# Patient Record
Sex: FEMALE | Race: WHITE | NOT HISPANIC OR LATINO | Employment: OTHER | URBAN - METROPOLITAN AREA
[De-identification: names, ages, dates, MRNs, and addresses within clinical notes are randomized per-mention and may not be internally consistent; named-entity substitution may affect disease eponyms.]

---

## 2017-04-13 ENCOUNTER — HOSPITAL ENCOUNTER (OUTPATIENT)
Dept: MAMMOGRAPHY | Facility: CLINIC | Age: 72
Discharge: HOME | End: 2017-04-13
Attending: FAMILY MEDICINE | Admitting: FAMILY MEDICINE
Payer: COMMERCIAL

## 2017-04-13 DIAGNOSIS — Z12.31 VISIT FOR SCREENING MAMMOGRAM: ICD-10-CM

## 2017-04-13 PROCEDURE — G0202 SCR MAMMO BI INCL CAD: HCPCS

## 2017-04-13 PROCEDURE — 77063 BREAST TOMOSYNTHESIS BI: CPT

## 2017-08-29 ENCOUNTER — TRANSFERRED RECORDS (OUTPATIENT)
Dept: HEALTH INFORMATION MANAGEMENT | Facility: CLINIC | Age: 72
End: 2017-08-29

## 2017-08-30 ENCOUNTER — HOSPITAL ENCOUNTER (OUTPATIENT)
Dept: CARDIOLOGY | Facility: CLINIC | Age: 72
Discharge: HOME | End: 2017-08-30
Attending: INTERNAL MEDICINE | Admitting: INTERNAL MEDICINE
Payer: COMMERCIAL

## 2017-08-30 ENCOUNTER — OFFICE VISIT (OUTPATIENT)
Dept: CARDIOLOGY | Facility: CLINIC | Age: 72
End: 2017-08-30
Payer: COMMERCIAL

## 2017-08-30 ENCOUNTER — CARE COORDINATION (OUTPATIENT)
Dept: CARDIOLOGY | Facility: CLINIC | Age: 72
End: 2017-08-30

## 2017-08-30 VITALS
SYSTOLIC BLOOD PRESSURE: 120 MMHG | BODY MASS INDEX: 28.02 KG/M2 | WEIGHT: 148.4 LBS | HEART RATE: 62 BPM | DIASTOLIC BLOOD PRESSURE: 90 MMHG | HEIGHT: 61 IN

## 2017-08-30 DIAGNOSIS — R07.89 CHEST TIGHTNESS OR PRESSURE: ICD-10-CM

## 2017-08-30 DIAGNOSIS — Z91.89 CARDIOVASCULAR RISK FACTOR: ICD-10-CM

## 2017-08-30 DIAGNOSIS — R94.31 NONSPECIFIC ABNORMAL ELECTROCARDIOGRAM (ECG) (EKG): ICD-10-CM

## 2017-08-30 DIAGNOSIS — Z72.0 TOBACCO ABUSE: ICD-10-CM

## 2017-08-30 DIAGNOSIS — E78.5 HYPERLIPIDEMIA WITH TARGET LDL LESS THAN 70: ICD-10-CM

## 2017-08-30 DIAGNOSIS — R94.31 NONSPECIFIC ABNORMAL ELECTROCARDIOGRAM (ECG) (EKG): Primary | ICD-10-CM

## 2017-08-30 PROCEDURE — 93350 STRESS TTE ONLY: CPT | Mod: 26 | Performed by: INTERNAL MEDICINE

## 2017-08-30 PROCEDURE — 93321 DOPPLER ECHO F-UP/LMTD STD: CPT | Mod: 26 | Performed by: INTERNAL MEDICINE

## 2017-08-30 PROCEDURE — 93016 CV STRESS TEST SUPVJ ONLY: CPT | Performed by: INTERNAL MEDICINE

## 2017-08-30 PROCEDURE — 40000264 ECHO STRESS WITH OPTISON

## 2017-08-30 PROCEDURE — 93018 CV STRESS TEST I&R ONLY: CPT | Performed by: INTERNAL MEDICINE

## 2017-08-30 PROCEDURE — 93000 ELECTROCARDIOGRAM COMPLETE: CPT | Performed by: INTERNAL MEDICINE

## 2017-08-30 PROCEDURE — 99204 OFFICE O/P NEW MOD 45 MIN: CPT | Mod: 25 | Performed by: INTERNAL MEDICINE

## 2017-08-30 PROCEDURE — 93325 DOPPLER ECHO COLOR FLOW MAPG: CPT | Mod: 26 | Performed by: INTERNAL MEDICINE

## 2017-08-30 PROCEDURE — 25500064 ZZH RX 255 OP 636: Performed by: INTERNAL MEDICINE

## 2017-08-30 RX ADMIN — HUMAN ALBUMIN MICROSPHERES AND PERFLUTREN 6 ML: 10; .22 INJECTION, SOLUTION INTRAVENOUS at 11:24

## 2017-08-30 NOTE — LETTER
8/30/2017    Rupali Hays MD  [place] [facility] [facility]  [address]  [place] [address]    RE: Evangelina Laurent       Dear Colleague,    I had the pleasure of seeing Evangelina Laurent in the [facility].    INDICATION FOR CARDIAC CONSULTATION:  Abnormal EKG.        It was my pleasure to see your patient, Evangelina Laurent, who is a very pleasant 71-year-old female patient who has multiple risk factors for coronary artery disease.  She is a smoker and has been smoking continuously for 6 years.  Her sister had a heart attack.  Her father had congestive heart failure.  She has a history of hyperlipidemia for which she has been treated with atorvastatin.  She also has a history of prediabetes.        With that background in mind, on Friday, which is approximately 5 days ago, she had an episode of pressure in her upper chest area and chest discomfort.  She felt that there was possibly indigestion.  For approximately 5 hours, she vomited and felt much better.  There was no radiation to the arms or to the jaw.  She has not been complaining of exertional chest pain or chest pressure in the days or months leading up to this event.  She has had no recurrence of the event.  She was not complaining of shortness of breath during the chest discomfort.  She also had no symptoms of syncope or near-syncope.  She had an EKG performed and on the electronic readout was that there was an inferior myocardial infarct.  Indeed, the complexes were low voltage and always could not be seen well in III and aVF were clearly normal in lead II.        We repeated the EKG today and that was normal on our reading, so there was no evidence of an inferior myocardial infarct today.  Clearly R waves were present in aVF and in lead II.  The remainder of the EKG complexes were normal.        The patient's blood pressure was normal today with the diastolic pressure being at the upper normal limits at 90; systolic  pressure was 120.     Outpatient Encounter Prescriptions as of 8/30/2017   Medication Sig Dispense Refill     atorvastatin (LIPITOR) 40 MG tablet Take 40 mg by mouth daily  3     FLUoxetine (PROZAC) 40 MG capsule TAKE 1 CAPSULE BY MOUTH DAILY  3     levothyroxine (SYNTHROID, LEVOTHROID) 88 MCG tablet Take 88 mcg by mouth daily  4     multivitamin, therapeutic with minerals (MULTI-VITAMIN) TABS Take 1 tablet by mouth daily       SENNA PO Take 2 tablets by mouth daily        Cholecalciferol (VITAMIN D3 PO) Take by mouth daily       DiphenhydrAMINE HCl (BENADRYL PO) Take by mouth nightly as needed        No facility-administered encounter medications on file as of 8/30/2017.       IMPRESSION:   1.  Chest discomfort.  Given that the patient has multiple risk factors for coronary artery disease, it will be important to rule out ischemia.  I do not think that the patient had an inferior myocardial infarct and is most likely that the difference between the two EKGs is lead positioning.  Certainly an inferior myocardial infarct is not going to go away in 5 days.  It is definitely useful to repeat the EKG today to show that an inferior myocardial infarct did not occur, at least based upon the EKG criteria.   2.  Multiple risk factors for coronary artery disease.   3.  Tobacco abuse.   4.  Prediabetes.   5.  Treated hyperlipidemia.      PLAN:  We will obtain a stress echocardiogram.  We will have this done quickly as the patient is moving to [place] in early September.  In fact, her health insurance in [place] ends on Friday.  We will try and get a stress echocardiogram performed either this afternoon or tomorrow.  This will be sufficient to ensure that the patient did not have an inferior MI and also the patient does not have ischemia.  Finally, I did tell the patient that she needs to stop smoking immediately.      It has been a pleasure to be involved in the care of this extremely nice patient.  We will contact her with  the results of the stress test.  I did explain to her how important it was to have the stress test before she traveled to [place].  She is driving down and certainly one would not like to have somebody on the road with the possibility of unstable angina pectoris.      It been my pleasure to be involved in the care of this very nice patient.     Sincerely,    Zhang Marshall MD    [facility]

## 2017-08-30 NOTE — PROGRESS NOTES
Negative stress echo. Would let patient know. OK to travel to [place]. No evidence of inferior MI. Should stop smoking indefinitely. Thx

## 2017-08-30 NOTE — MR AVS SNAPSHOT
After Visit Summary   8/30/2017    Evangelina Laurent    MRN: [id number]           Patient Information     Date Of Birth          1945        Visit Information        Provider Department      8/30/2017 8:45 AM Zhang Marshall MD [facility] AT [place]        Today's Diagnoses     Nonspecific abnormal electrocardiogram (ECG) (EKG)    -  1    Chest tightness or pressure        Cardiovascular risk factor        Tobacco abuse        Hyperlipidemia with target LDL less than 70           Follow-ups after your visit        Your next 10 appointments already scheduled     Aug 30, 2017 10:30 AM CDT   Ech Stress Test with [id number]   [facility] CV Echocardiography (Cardiovascular Imaging at [facility])    [address] [address] [phone]           1. Please bring or wear a comfortable two-piece outfit and walking shoes. 2. Stop eating 3 hours before the test. You may drink water or juice. 3. Stop all caffeine 12 hours before the test. This includes coffee, tea, soda pop, chocolate and certain medicines (such as Anacin and Excederin). Also avoid decaf coffee and tea, as these contain small amounts of caffeine. 4. No alcohol, smoking or use of other tobacco products for 12 hours before the test. 5. Refer to your provider instructions to see if you need to stop any medications (such as beta-blockers or nitrates) for this test. 6. For patients with diabetes: - If you take insulin, call your diabetes care team. Ask if you should take a   dose the morning of your test. - If you take diabetes medicine by mouth, dont take it on the morning of your test. Bring it with you to take after the test. (If you have questions, call your diabetes care team) 7. When you arrive, please tell us if: - You have diabetes. - You have taken Viagra, Cialis or Levitra in the past 48 hours. 8. For any questions that cannot be answered, please  "contact the ordering physician              Future tests that were ordered for you today     Open Future Orders        Priority Expected Expires Ordered    Exercise Stress Echocardiogram Routine 2017            Who to contact     If you have questions or need follow up information about today's clinic visit or your schedule please contact [facility]YS[facility] HE[facility] AT [place] directly at [phone].  Normal or non-critical lab and imaging results will be communicated to you by MyChart, letter or phone within 4 business days after the clinic has received the results. If you do not hear from us within 7 days, please contact the clinic through [employer] or phone. If you have a critical or abnormal lab result, we will notify you by phone as soon as possible.  Submit refill requests through [facility] or call your pharmacy and they will forward the refill request to us. Please allow 3 business days for your refill to be completed.          Additional Information About Your Visit        [employer] Information     [facility] lets you send messages to your doctor, view your test results, renew your prescriptions, schedule appointments and more. To sign up, go to [email or web address] . Click on \"Log in\" on the left side of the screen, which will take you to the Welcome page. Then click on \"Sign up Now\" on the right side of the page.     You will be asked to enter the access code listed below, as well as some personal information. Please follow the directions to create your username and password.     Your access code is: [email or web address]  Expires: 2017  9:15 AM     Your access code will  in 90 days. If you need help or a new code, please call your [place] clinic or [phone].        Care EveryWhere ID     This is your Care EveryWhere ID. This could be used by other organizations to access your [place] medical records  [id number]        Your Vitals Were     Pulse Height BMI " "(Body Mass Index)             62 1.549 m (5' 1\") 28.04 kg/m2          Blood Pressure from Last 3 Encounters:   08/30/17 120/90   10/25/16 102/78    Weight from Last 3 Encounters:   08/30/17 67.3 kg (148 lb 6.4 oz)   12/01/16 70.8 kg (156 lb)   10/25/16 69.9 kg (154 lb)              We Performed the Following     EKG 12-lead complete w/read - Clinics        Primary Care Provider Office Phone # Fax #    Rupali Darline Hays [phone]       [facility] [facility] [address]  [place] [address]        Equal Access to Services     BRET JONES : Hadii aad ku hadasho Sonicciali, waaxda luqadaha, qaybta kaalmada adeegyada, waxaugust boyer . So [facility] [phone].    ATENCIÓN: Si habla español, tiene a raza disposición servicios gratuitos de asistencia lingüística. Ll[facility] [phone].    We comply with applicable federal civil rights laws and [place] laws. We do not discriminate on the basis of race, color, national origin, age, disability sex, sexual orientation or gender identity.            Thank you!     Thank you for choosing [facility] PHYSIC[facility] HE[facility] AT [place]  for your care. Our goal is always to provide you with excellent care. Hearing back from our patients is one way we can continue to improve our services. Please take a few minutes to complete the written survey that you may receive in the mail after your visit with us. Thank you!             Your Updated Medication List - Protect others around you: Learn how to safely use, store and throw away your medicines at www.disposemymeds.org.          This list is accurate as of: 8/30/17  9:15 AM.  Always use your most recent med list.                   Brand Name Dispense Instructions for use Diagnosis    atorvastatin 40 MG tablet    LIPITOR     Take 40 mg by mouth daily        BENADRYL PO      Take by mouth nightly as needed        FLUoxetine 40 MG capsule    PROzac     TAKE 1 CAPSULE BY MOUTH " DAILY        levothyroxine 88 MCG tablet    SYNTHROID/LEVOTHROID     Take 88 mcg by mouth daily        Multi-vitamin Tabs tablet      Take 1 tablet by mouth daily        SENNA PO      Take 2 tablets by mouth daily        VITAMIN D3 PO      Take by mouth daily

## 2017-08-30 NOTE — PROGRESS NOTES
INDICATION FOR CARDIAC CONSULTATION:  Abnormal EKG.        HISTORY OF PRESENT ILLNESS:  It was my pleasure to see your patient, Evangelina Laurent, who is a very pleasant 71-year-old female patient who has multiple risk factors for coronary artery disease.  She is a smoker and has been smoking continuously for 6 years.  Her sister had a heart attack.  Her father had congestive heart failure.  She has a history of hyperlipidemia for which she has been treated with atorvastatin.  She also has a history of prediabetes.        With that background in mind, on Friday, which is approximately 5 days ago, she had an episode of pressure in her upper chest area and chest discomfort.  She felt that there was possibly indigestion.  For approximately 5 hours, she vomited and felt much better.  There was no radiation to the arms or to the jaw.  She has not been complaining of exertional chest pain or chest pressure in the days or months leading up to this event.  She has had no recurrence of the event.  She was not complaining of shortness of breath during the chest discomfort.  She also had no symptoms of syncope or near-syncope.  She had an EKG performed and on the electronic readout was that there was an inferior myocardial infarct.  Indeed, the complexes were low voltage and always could not be seen well in III and aVF were clearly normal in lead II.        We repeated the EKG today and that was normal on our reading, so there was no evidence of an inferior myocardial infarct today.  Clearly R waves were present in aVF and in lead II.  The remainder of the EKG complexes were normal.        The patient's blood pressure was normal today with the diastolic pressure being at the upper normal limits at 90; systolic pressure was 120.      IMPRESSION:   1.  Chest discomfort.  Given that the patient has multiple risk factors for coronary artery disease, it will be important to rule out ischemia.  I do not think that the patient had an  inferior myocardial infarct and is most likely that the difference between the two EKGs is lead positioning.  Certainly an inferior myocardial infarct is not going to go away in 5 days.  It is definitely useful to repeat the EKG today to show that an inferior myocardial infarct did not occur, at least based upon the EKG criteria.   2.  Multiple risk factors for coronary artery disease.   3.  Tobacco abuse.   4.  Prediabetes.   5.  Treated hyperlipidemia.      PLAN:  We will obtain a stress echocardiogram.  We will have this done quickly as the patient is moving to [place] in early September.  In fact, her health insurance in [place] ends on Friday.  We will try and get a stress echocardiogram performed either this afternoon or tomorrow.  This will be sufficient to ensure that the patient did not have an inferior MI and also the patient does not have ischemia.  Finally, I did tell the patient that she needs to stop smoking immediately.      It has been a pleasure to be involved in the care of this extremely nice patient.  We will contact her with the results of the stress test.  I did explain to her how important it was to have the stress test before she traveled to [place].  She is driving down and certainly one would not like to have somebody on the road with the possibility of unstable angina pectoris.      It been my pleasure to be involved in the care of this very nice patient.      cc:   Rupali Hays MD    [facility]   [address]   [place] [address]         FATOUMATA LIU MD, [facility]             D: 2017 09:14   T: 2017 10:04   MT: HAIDER      Name:     MATHEW GALINDO   MRN:      [id number]        Account:      [id number]   :      1945           Service Date: 2017      Document: [id number]

## 2017-08-30 NOTE — PROGRESS NOTES
HPI and Plan:   See dictation    Orders Placed This Encounter   Procedures     EKG 12-lead complete w/read - Clinics     Exercise Stress Echocardiogram       No orders of the defined types were placed in this encounter.      There are no discontinued medications.      Encounter Diagnoses   Name Primary?     Nonspecific abnormal electrocardiogram (ECG) (EKG) Yes     Chest tightness or pressure      Cardiovascular risk factor      Tobacco abuse        CURRENT MEDICATIONS:  Current Outpatient Prescriptions   Medication Sig Dispense Refill     atorvastatin (LIPITOR) 40 MG tablet Take 40 mg by mouth daily  3     FLUoxetine (PROZAC) 40 MG capsule TAKE 1 CAPSULE BY MOUTH DAILY  3     levothyroxine (SYNTHROID, LEVOTHROID) 88 MCG tablet Take 88 mcg by mouth daily  4     multivitamin, therapeutic with minerals (MULTI-VITAMIN) TABS Take 1 tablet by mouth daily       SENNA PO Take 2 tablets by mouth daily        Cholecalciferol (VITAMIN D3 PO) Take by mouth daily       DiphenhydrAMINE HCl (BENADRYL PO) Take by mouth nightly as needed          ALLERGIES   No Known Allergies    PAST MEDICAL HISTORY:  History reviewed. No pertinent past medical history.    PAST SURGICAL HISTORY:  History reviewed. No pertinent surgical history.    FAMILY HISTORY:  Family History   Problem Relation Age of Onset     DIABETES Maternal Grandmother      Hypertension Maternal Grandmother      Heart Failure Maternal Grandfather      Coronary Artery Disease Sister      Hypertension Son      DIABETES Nephew        SOCIAL HISTORY:  Social History     Social History     Marital status:      Spouse name: N/A     Number of children: N/A     Years of education: N/A     Social History Main Topics     Smoking status: Former Smoker     Quit date: 8/29/2017     Smokeless tobacco: Never Used     Alcohol use None     Drug use: None     Sexual activity: Not Asked     Other Topics Concern     None     Social History Narrative       Review of Systems:  Skin:   "Negative       Eyes:  Positive for glasses (reading only)    ENT:  Positive for hearing loss (pt wears hearing aids)    Respiratory:  Negative       Cardiovascular:    Positive for (chest heaviness)    Gastroenterology: Positive for vomiting;reflux    Genitourinary:  Negative      Musculoskeletal:  Negative      Neurologic:  Negative      Psychiatric:  Positive for sleep disturbances (pt has trouble falling asleep)    Heme/Lymph/Imm:  Negative      Endocrine:  Negative        Physical Exam:  Vitals: /90  Pulse 62  Ht 1.549 m (5' 1\")  Wt 67.3 kg (148 lb 6.4 oz)  BMI 28.04 kg/m2    Constitutional:  cooperative, alert and oriented, well developed, well nourished, in no acute distress        Skin:  warm and dry to the touch, no apparent skin lesions or masses noted        Head:  normocephalic, no masses or lesions        Eyes:  pupils equal and round, conjunctivae and lids unremarkable, sclera white, no xanthalasma, EOMS intact, no nystagmus        ENT:  no pallor or cyanosis, dentition good        Neck:  carotid pulses are full and equal bilaterally, JVP normal, no carotid bruit, no thyromegaly        Chest:  normal breath sounds, clear to auscultation, normal A-P diameter, normal symmetry, normal respiratory excursion, no use of accessory muscles          Cardiac: regular rhythm, normal S1/S2, no S3 or S4, apical impulse not displaced, no murmurs, gallops or rubs                  Abdomen:  not assessed this visit        Vascular: pulses full and equal, no bruits auscultated                                        Extremities and Back:  no deformities, clubbing, cyanosis, erythema observed;no edema              Neurological:  affect appropriate, oriented to time, person and place;no gross motor deficits              CC  Rupali Hays MD  [place] [facility]  [address]  [place] [address]              "

## 2017-08-30 NOTE — PROGRESS NOTES
"Per OV note from Dr. VENTURA (8/30/17),   \"PLAN:  We will obtain a stress echocardiogram.  We will have this done quickly as the patient is moving to [place] in early September.  In fact, her health insurance in [place] ends on Friday.  We will try and get a stress echocardiogram performed either this afternoon or tomorrow.  This will be sufficient to ensure that the patient did not have an inferior MI and also the patient does not have ischemia.  Finally, I did tell the patient that she needs to stop smoking immediately.\"      Stress test results available for review.     Interpretation Summary  Stress echo negative for ischemia by ekg and echo criteria. However, post  stress images (parasternal) obtained more than 1min after cessation of  exercise. This could decrease sensitivity. Correlate clinically.  There was no chest pain or significant ST changes with exercise.  The Duke treadmill score was low risk ( >5 Lemus score).  The study was technically difficult. There is no comparison study available.    Will route to Dr. Marshall for review.   "

## 2017-08-31 NOTE — PROGRESS NOTES
Called patient with results of stress echocardiogram.  Pt states understanding and offers no further questions at this time.

## 2019-03-25 ENCOUNTER — TRANSFERRED RECORDS (OUTPATIENT)
Dept: HEALTH INFORMATION MANAGEMENT | Facility: CLINIC | Age: 74
End: 2019-03-25

## 2019-04-16 DIAGNOSIS — R31.9 HEMATURIA: Primary | ICD-10-CM

## 2019-05-21 ENCOUNTER — OFFICE VISIT (OUTPATIENT)
Dept: UROLOGY | Facility: CLINIC | Age: 74
End: 2019-05-21
Payer: COMMERCIAL

## 2019-05-21 VITALS
WEIGHT: 155 LBS | SYSTOLIC BLOOD PRESSURE: 160 MMHG | DIASTOLIC BLOOD PRESSURE: 80 MMHG | BODY MASS INDEX: 29.27 KG/M2 | HEART RATE: 57 BPM | HEIGHT: 61 IN | OXYGEN SATURATION: 98 %

## 2019-05-21 DIAGNOSIS — R31.29 OTHER MICROSCOPIC HEMATURIA: Primary | ICD-10-CM

## 2019-05-21 DIAGNOSIS — R31.21 ASYMPTOMATIC MICROSCOPIC HEMATURIA: ICD-10-CM

## 2019-05-21 LAB
ALBUMIN UR-MCNC: NEGATIVE MG/DL
APPEARANCE UR: CLEAR
BILIRUB UR QL STRIP: NEGATIVE
COLOR UR AUTO: YELLOW
GLUCOSE UR STRIP-MCNC: NEGATIVE MG/DL
HGB UR QL STRIP: ABNORMAL
KETONES UR STRIP-MCNC: NEGATIVE MG/DL
LEUKOCYTE ESTERASE UR QL STRIP: ABNORMAL
NITRATE UR QL: NEGATIVE
PH UR STRIP: 5 PH (ref 5–7)
SOURCE: ABNORMAL
SP GR UR STRIP: 1.02 (ref 1–1.03)
UROBILINOGEN UR STRIP-ACNC: 0.2 EU/DL (ref 0.2–1)

## 2019-05-21 PROCEDURE — 52000 CYSTOURETHROSCOPY: CPT | Performed by: UROLOGY

## 2019-05-21 PROCEDURE — 88112 CYTOPATH CELL ENHANCE TECH: CPT | Performed by: UROLOGY

## 2019-05-21 PROCEDURE — 81003 URINALYSIS AUTO W/O SCOPE: CPT | Performed by: UROLOGY

## 2019-05-21 ASSESSMENT — MIFFLIN-ST. JEOR: SCORE: 1155.46

## 2019-05-21 ASSESSMENT — PAIN SCALES - GENERAL: PAINLEVEL: MILD PAIN (2)

## 2019-05-21 NOTE — PATIENT INSTRUCTIONS
"AFTER YOUR CYSTOSCOPY  ?  ?  You have just completed a cystoscopy, or \"cysto\", which allowed your physician to learn more about your bladder (or to remove a stent placed after surgery). We suggest that you continue to avoid caffeine, fruit juice, and alcohol for the next 24 hours, however, you are encouraged to return to your normal activities.  ?  ?  A few things that are considered normal after your cystoscopy:  ?  * small amount of bleeding (or spotting) that clears within the next 24 hours  ?  * slight burning sensation with urination  ?  * sensation of needing to void (urinate) more frequently  ?  * the feeling of \"air\" in your urine  ?  * mild discomfort that is relieved with Tylenol    * bladder spasms  ?  ?  ?  Please contact our office promptly if you:  ?  * develop a fever above 101 degrees  ?  * are unable to urinate  ?  * develop bright red blood that does not stop  ?  * experience severe pain or swelling  ?  ?  ?  And of course, please contact our office with any concerns or questions [phone]  ?      AFTER YOUR CYSTOSCOPY        You have just completed a cystoscopy, or \"cysto\", which allowed your physician to learn more about your bladder (or to remove a stent placed after surgery). We suggest that you continue to avoid caffeine, fruit juice, and alcohol for the next 24 hours, however, you are encouraged to return to your normal activities.         A few things that are considered normal after your cystoscopy:     * Small amount of bleeding (or spotting) that clears within the next 24 hours     * Slight burning sensation with urination     * Sensation to of needing to avoid more frequently     * The feeling of \"air\" in your urine     * Mild discomfort that is relieved with Tylenol        Please contact our office promptly if you:     * Develop a fever above 101 degrees     * Are unable to urinate     * Develop bright red blood that does not stop     * Severe pain or swelling         Please contact " our office with any concerns or questions @[facility].

## 2019-05-21 NOTE — NURSING NOTE
"Chief Complaint   Patient presents with     Micro-Hematuria     Patient here today for possible Cystoscopy       Blood pressure 160/80, pulse 57, height 1.549 m (5' 1\"), weight 70.3 kg (155 lb), SpO2 98 %, not currently breastfeeding. Body mass index is 29.29 kg/m .    There is no problem list on file for this patient.      No Known Allergies    Current Outpatient Medications   Medication Sig Dispense Refill     atorvastatin (LIPITOR) 40 MG tablet Take 40 mg by mouth daily  3     Cholecalciferol (VITAMIN D3 PO) Take by mouth daily       DiphenhydrAMINE HCl (BENADRYL PO) Take by mouth nightly as needed        FLUoxetine (PROZAC) 40 MG capsule TAKE 1 CAPSULE BY MOUTH DAILY  3     levothyroxine (SYNTHROID, LEVOTHROID) 88 MCG tablet Take 88 mcg by mouth daily  4     multivitamin, therapeutic with minerals (MULTI-VITAMIN) TABS Take 1 tablet by mouth daily       SENNA PO Take 2 tablets by mouth daily          Social History     Tobacco Use     Smoking status: Former Smoker     Last attempt to quit: 2017     Years since quittin.7     Smokeless tobacco: Never Used   Substance Use Topics     Alcohol use: None     Drug use: None       UA RESULTS:  Recent Labs   Lab Test 19  1039  10/25/16  1349   COLOR Yellow   < >  --    APPEARANCE Clear   < >  --    URINEGLC Negative   < >  --    URINEBILI Negative   < >  --    URINEKETONE Negative   < >  --    SG 1.025   < >  --    UBLD Trace*   < >  --    URINEPH 5.0   < >  --    PROTEIN Negative   < >  --    UROBILINOGEN 0.2   < >  --    NITRITE Negative   < >  --    LEUKEST Trace*   < >  --    RBCU  --   --  3   WBCU  --   --  1    < > = values in this interval not displayed.       "

## 2019-05-21 NOTE — LETTER
5/21/2019       RE: Evangelina Laurent  [address] [facility] [address]     Dear Colleague,    Thank you for referring your patient, Evangelina Laurent, to the [facility] [place] at [facility]. Please see a copy of my visit note below.    CYSTO FEMALE    Discussed with patient the alternatives, risks, and procedure. Questions were answered. Informed consent was obtained for cystoscopy.    May 24, 2019 CYSTOSCOPY:  The patient was brought to the procedures room where she was placed in the supine position.  She was prepped and draped in a sterile fashion.  A #16-Slovenian flexible cystoscope was introduced into the urinary bladder.  The anterior urethra, membranous urethra, and bladder neck were negative.   Within the urinary bladder, there was no evidence of stones, tumors, or growths.  The ureteral orifices were well visualized bilaterally and found to have clear efflux of urine.   The patient had minimal trabeculation.  On retroflex view, no lesions were seen.    Patient with a history of microhematuria for which she had a negative work up previous.   Imaging recently was negative   Cystoscopy negative   Will send U/a, urine culture, urine cytology     Again, thank you for allowing me to participate in the care of your patient.      Sincerely,    Christine Abdalla MD

## 2019-05-23 LAB — COPATH REPORT: NORMAL

## 2019-05-24 NOTE — PROGRESS NOTES
CYSTO FEMALE    Discussed with patient the alternatives, risks, and procedure. Questions were answered. Informed consent was obtained for cystoscopy.    May 24, 2019 CYSTOSCOPY:  The patient was brought to the procedures room where she was placed in the supine position.  She was prepped and draped in a sterile fashion.  A #16-[place]ux[place]bourg[place] flexible cystoscope was introduced into the urinary bladder.  The anterior urethra, membranous urethra, and bladder neck were negative.   Within the urinary bladder, there was no evidence of stones, tumors, or growths.  The ureteral orifices were well visualized bilaterally and found to have clear efflux of urine.   The patient had minimal trabeculation.  On retroflex view, no lesions were seen.      Patient with a history of microhematuria for which she had a negative work up previous.   Imaging recently was negative   Cystoscopy negative   Will send U/a, urine culture, urine cytology

## 2021-01-04 ENCOUNTER — HOSPITAL ENCOUNTER (OUTPATIENT)
Dept: MAMMOGRAPHY | Facility: CLINIC | Age: 76
Discharge: HOME | End: 2021-01-04
Attending: FAMILY MEDICINE | Admitting: FAMILY MEDICINE
Payer: COMMERCIAL

## 2021-01-04 DIAGNOSIS — Z12.31 OTHER SCREENING MAMMOGRAM: ICD-10-CM

## 2021-01-04 PROCEDURE — 77063 BREAST TOMOSYNTHESIS BI: CPT

## 2021-03-05 ENCOUNTER — IMMUNIZATION (OUTPATIENT)
Dept: NURSING | Facility: CLINIC | Age: 76
End: 2021-03-05
Payer: COMMERCIAL

## 2021-03-05 PROCEDURE — 91301 PR COVID VAC MODERNA 100 MCG/0.5 ML IM: CPT

## 2021-03-05 PROCEDURE — 0011A PR COVID VAC MODERNA 100 MCG/0.5 ML IM: CPT

## 2021-03-14 ENCOUNTER — HEALTH MAINTENANCE LETTER (OUTPATIENT)
Age: 76
End: 2021-03-14

## 2021-04-02 ENCOUNTER — IMMUNIZATION (OUTPATIENT)
Dept: NURSING | Facility: CLINIC | Age: 76
End: 2021-04-02
Attending: INTERNAL MEDICINE
Payer: COMMERCIAL

## 2021-04-02 PROCEDURE — 0012A PR COVID VAC MODERNA 100 MCG/0.5 ML IM: CPT

## 2021-04-02 PROCEDURE — 91301 PR COVID VAC MODERNA 100 MCG/0.5 ML IM: CPT

## 2021-10-24 ENCOUNTER — HEALTH MAINTENANCE LETTER (OUTPATIENT)
Age: 76
End: 2021-10-24

## 2021-11-04 ENCOUNTER — IMMUNIZATION (OUTPATIENT)
Dept: NURSING | Facility: CLINIC | Age: 76
End: 2021-11-04
Payer: COMMERCIAL

## 2021-11-04 PROCEDURE — 0064A PR COVID VAC MODERNA 100 MCG/0.5 ML IM: CPT

## 2021-11-04 PROCEDURE — 91301 PR COVID VAC MODERNA 100 MCG/0.5 ML IM: CPT

## 2021-12-19 ENCOUNTER — HEALTH MAINTENANCE LETTER (OUTPATIENT)
Age: 76
End: 2021-12-19

## 2022-04-04 ENCOUNTER — IMMUNIZATION (OUTPATIENT)
Dept: NURSING | Facility: CLINIC | Age: 77
End: 2022-04-04
Payer: COMMERCIAL

## 2022-04-04 PROCEDURE — 0064A COVID-19,PF,MODERNA (18+ YRS BOOSTER .25ML): CPT

## 2022-04-04 PROCEDURE — 91306 COVID-19,PF,MODERNA (18+ YRS BOOSTER .25ML): CPT

## 2022-10-10 ENCOUNTER — HEALTH MAINTENANCE LETTER (OUTPATIENT)
Age: 77
End: 2022-10-10

## 2023-03-13 ENCOUNTER — HOSPITAL ENCOUNTER (OUTPATIENT)
Dept: MAMMOGRAPHY | Facility: CLINIC | Age: 78
Discharge: HOME | End: 2023-03-13
Attending: PHYSICIAN ASSISTANT | Admitting: PHYSICIAN ASSISTANT
Payer: COMMERCIAL

## 2023-03-13 DIAGNOSIS — Z12.31 VISIT FOR SCREENING MAMMOGRAM: ICD-10-CM

## 2023-03-13 PROCEDURE — 77067 SCR MAMMO BI INCL CAD: CPT

## 2023-03-25 ENCOUNTER — HEALTH MAINTENANCE LETTER (OUTPATIENT)
Age: 78
End: 2023-03-25

## 2024-03-17 ENCOUNTER — HEALTH MAINTENANCE LETTER (OUTPATIENT)
Age: 79
End: 2024-03-17

## 2025-03-22 ENCOUNTER — HEALTH MAINTENANCE LETTER (OUTPATIENT)
Age: 80
End: 2025-03-22

## 2025-06-16 ENCOUNTER — HOSPITAL ENCOUNTER (OUTPATIENT)
Dept: MAMMOGRAPHY | Facility: CLINIC | Age: 80
Discharge: HOME | End: 2025-06-16
Attending: PHYSICIAN ASSISTANT | Admitting: PHYSICIAN ASSISTANT
Payer: COMMERCIAL

## 2025-06-16 DIAGNOSIS — Z12.31 VISIT FOR SCREENING MAMMOGRAM: ICD-10-CM

## 2025-06-16 PROCEDURE — 77063 BREAST TOMOSYNTHESIS BI: CPT

## 2025-08-16 ENCOUNTER — HEALTH MAINTENANCE LETTER (OUTPATIENT)
Age: 80
End: 2025-08-16